# Patient Record
Sex: FEMALE | Race: WHITE | NOT HISPANIC OR LATINO
[De-identification: names, ages, dates, MRNs, and addresses within clinical notes are randomized per-mention and may not be internally consistent; named-entity substitution may affect disease eponyms.]

---

## 2018-11-27 ENCOUNTER — RECORD ABSTRACTING (OUTPATIENT)
Age: 38
End: 2018-11-27

## 2018-11-27 DIAGNOSIS — Z83.1 FAMILY HISTORY OF OTHER INFECTIOUS AND PARASITIC DISEASES: ICD-10-CM

## 2018-11-27 DIAGNOSIS — Z82.49 FAMILY HISTORY OF ISCHEMIC HEART DISEASE AND OTHER DISEASES OF THE CIRCULATORY SYSTEM: ICD-10-CM

## 2018-11-27 DIAGNOSIS — Z86.19 PERSONAL HISTORY OF OTHER INFECTIOUS AND PARASITIC DISEASES: ICD-10-CM

## 2018-11-27 DIAGNOSIS — Z80.3 FAMILY HISTORY OF MALIGNANT NEOPLASM OF BREAST: ICD-10-CM

## 2019-01-18 ENCOUNTER — APPOINTMENT (OUTPATIENT)
Dept: OBGYN | Facility: CLINIC | Age: 39
End: 2019-01-18
Payer: COMMERCIAL

## 2019-01-18 VITALS
DIASTOLIC BLOOD PRESSURE: 68 MMHG | WEIGHT: 154.9 LBS | SYSTOLIC BLOOD PRESSURE: 110 MMHG | HEIGHT: 63 IN | BODY MASS INDEX: 27.45 KG/M2

## 2019-01-18 DIAGNOSIS — E55.9 VITAMIN D DEFICIENCY, UNSPECIFIED: ICD-10-CM

## 2019-01-18 PROCEDURE — 36415 COLL VENOUS BLD VENIPUNCTURE: CPT

## 2019-01-18 PROCEDURE — 99395 PREV VISIT EST AGE 18-39: CPT

## 2019-01-18 RX ORDER — DOCUSATE SODIUM 100 MG/1
100 CAPSULE ORAL
Refills: 0 | Status: DISCONTINUED | COMMUNITY
End: 2019-01-18

## 2019-01-18 RX ORDER — COCOA BUTTER, PHENYLEPHRINE HYDROCHLORIDE 2211; 6.25 MG/1; MG/1
0.25-3-85.5 SUPPOSITORY RECTAL
Refills: 0 | Status: DISCONTINUED | COMMUNITY
End: 2019-01-18

## 2019-01-22 LAB
25(OH)D3 SERPL-MCNC: 40.8 NG/ML
ALBUMIN SERPL ELPH-MCNC: 4.7 G/DL
ALP BLD-CCNC: 66 U/L
ALT SERPL-CCNC: 23 U/L
ANION GAP SERPL CALC-SCNC: 12 MMOL/L
AST SERPL-CCNC: 21 U/L
BASOPHILS # BLD AUTO: 0.01 K/UL
BASOPHILS NFR BLD AUTO: 0.2 %
BILIRUB SERPL-MCNC: 0.4 MG/DL
BUN SERPL-MCNC: 12 MG/DL
CALCIUM SERPL-MCNC: 9.6 MG/DL
CHLORIDE SERPL-SCNC: 102 MMOL/L
CO2 SERPL-SCNC: 26 MMOL/L
CREAT SERPL-MCNC: 0.64 MG/DL
EOSINOPHIL # BLD AUTO: 0.06 K/UL
EOSINOPHIL NFR BLD AUTO: 1.2 %
GLUCOSE SERPL-MCNC: 94 MG/DL
HCT VFR BLD CALC: 42.4 %
HGB BLD-MCNC: 14 G/DL
IMM GRANULOCYTES NFR BLD AUTO: 0 %
LYMPHOCYTES # BLD AUTO: 2.27 K/UL
LYMPHOCYTES NFR BLD AUTO: 46.2 %
MAN DIFF?: NORMAL
MCHC RBC-ENTMCNC: 29.9 PG
MCHC RBC-ENTMCNC: 33 GM/DL
MCV RBC AUTO: 90.4 FL
MONOCYTES # BLD AUTO: 0.49 K/UL
MONOCYTES NFR BLD AUTO: 10 %
NEUTROPHILS # BLD AUTO: 2.08 K/UL
NEUTROPHILS NFR BLD AUTO: 42.4 %
PLATELET # BLD AUTO: 294 K/UL
POTASSIUM SERPL-SCNC: 4.3 MMOL/L
PROT SERPL-MCNC: 7.5 G/DL
RBC # BLD: 4.69 M/UL
RBC # FLD: 13.3 %
SODIUM SERPL-SCNC: 140 MMOL/L
WBC # FLD AUTO: 4.91 K/UL

## 2020-12-11 ENCOUNTER — APPOINTMENT (OUTPATIENT)
Dept: OBGYN | Facility: CLINIC | Age: 40
End: 2020-12-11
Payer: COMMERCIAL

## 2020-12-11 VITALS
WEIGHT: 149 LBS | HEIGHT: 63 IN | BODY MASS INDEX: 26.4 KG/M2 | DIASTOLIC BLOOD PRESSURE: 72 MMHG | SYSTOLIC BLOOD PRESSURE: 100 MMHG

## 2020-12-11 PROCEDURE — 99396 PREV VISIT EST AGE 40-64: CPT

## 2020-12-11 PROCEDURE — 81003 URINALYSIS AUTO W/O SCOPE: CPT | Mod: QW

## 2020-12-11 PROCEDURE — 99072 ADDL SUPL MATRL&STAF TM PHE: CPT

## 2020-12-11 RX ORDER — MULTIVITAMIN
CAPSULE ORAL
Refills: 0 | Status: ACTIVE | COMMUNITY

## 2020-12-11 RX ORDER — SENNOSIDES 8.6 MG
TABLET ORAL
Refills: 0 | Status: ACTIVE | COMMUNITY

## 2020-12-11 RX ORDER — UBIDECARENONE/VIT E ACET 100MG-5
CAPSULE ORAL
Refills: 0 | Status: ACTIVE | COMMUNITY

## 2020-12-11 NOTE — HISTORY OF PRESENT ILLNESS
[FreeTextEntry1] : Here as an established pt. C/o urinary incontinence when running. Considering surgery  if she doesn't have another baby (still trying to convince ). 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 Roberto going to  and Auggy doing well, busy.\par \par LMP/yr: 12/10/20 regular\par G/P: P2\par Last pap/ result: \par Hx of abnormal pap: none\par Hx of STI: none\par Birth control: pull out\par OB hx: \par Total Pregnancies: 3, Full Term: 2, Ectopics: 0,  Induced: 0, Premature: 0, Livin,  Spontaneous: 1, Multiple Births: 0. \par Past Pregnancies: \par #1 (): Vaginal delivery of 7#13 boy, at 39wks weeks GA . patient received anesthesia. Pregnancy #1 Comments: ("Roberto" PROM spont ctx, GBS pos. Epidural. BF for 10mos). \par #2 (2018): Vaginal delivery of 9#5 boy . patient did not receive anesthesia. Delivery occurred at [facility]. \par Med hx: none\par Surg hx: lasik, wisdom teeth\par \par Last mammo/breast u/s:  nl\par Sexual hx:\par Sex:\par Diet/exercise: running 25 miles/ wk\par Stress mgmt:\par Meds/supplements: CoQ10, multi, vit d\par PCP:  Dr Leda Pires\par Interval hx: none\par Changes in family hx: none\par \par

## 2020-12-11 NOTE — DISCUSSION/SUMMARY
[FreeTextEntry1] : Pap/HPV done\par Mammogram Rx given\par Breast self exam reinforced\par Annual labs done with PCP\par Referral to PT for stress incontinence\par Diet and exercise reviewed\par Stress mgmt strategies reviewed\par Supplements: multivitamin, fish oil, Vit D\par RTO 1yr or prn\par

## 2020-12-11 NOTE — PHYSICAL EXAM
[Appropriately responsive] : appropriately responsive [Alert] : alert [No Acute Distress] : no acute distress [No Lymphadenopathy] : no lymphadenopathy [Regular Rate Rhythm] : regular rate rhythm [No Murmurs] : no murmurs [Clear to Auscultation B/L] : clear to auscultation bilaterally [Soft] : soft [Non-tender] : non-tender [Non-distended] : non-distended [No HSM] : No HSM [No Lesions] : no lesions [No Mass] : no mass [Oriented x3] : oriented x3 [Examination Of The Breasts] : a normal appearance [No Masses] : no breast masses were palpable [Labia Majora] : normal [Labia Minora] : normal [Normal] : normal [Uterine Adnexae] : normal [FreeTextEntry3] : Some bladder prolapse

## 2020-12-14 LAB
CLARITY UR: CLEAR
COLLECTION METHOD: NORMAL
GLUCOSE UR-MCNC: NEGATIVE
HGB UR QL STRIP.AUTO: NEGATIVE
HPV HIGH+LOW RISK DNA PNL CVX: NOT DETECTED
KETONES UR-MCNC: NEGATIVE
LEUKOCYTE ESTERASE UR QL STRIP: NEGATIVE
NITRITE UR QL STRIP: NEGATIVE
PROT UR STRIP-MCNC: NEGATIVE

## 2020-12-18 ENCOUNTER — TRANSCRIPTION ENCOUNTER (OUTPATIENT)
Age: 40
End: 2020-12-18

## 2020-12-18 LAB — CYTOLOGY CVX/VAG DOC THIN PREP: NORMAL

## 2021-01-01 ENCOUNTER — TRANSCRIPTION ENCOUNTER (OUTPATIENT)
Age: 41
End: 2021-01-01

## 2021-12-14 ENCOUNTER — APPOINTMENT (OUTPATIENT)
Dept: OBGYN | Facility: CLINIC | Age: 41
End: 2021-12-14
Payer: COMMERCIAL

## 2021-12-14 VITALS — HEIGHT: 63 IN | SYSTOLIC BLOOD PRESSURE: 104 MMHG | DIASTOLIC BLOOD PRESSURE: 60 MMHG

## 2021-12-14 DIAGNOSIS — N89.8 OTHER SPECIFIED NONINFLAMMATORY DISORDERS OF VAGINA: ICD-10-CM

## 2021-12-14 DIAGNOSIS — Z00.00 ENCOUNTER FOR GENERAL ADULT MEDICAL EXAMINATION W/OUT ABNORMAL FINDINGS: ICD-10-CM

## 2021-12-14 DIAGNOSIS — R30.0 DYSURIA: ICD-10-CM

## 2021-12-14 LAB
BILIRUB UR QL STRIP: NEGATIVE
CLARITY UR: CLEAR
COLLECTION METHOD: NORMAL
GLUCOSE UR-MCNC: NEGATIVE
HGB UR QL STRIP.AUTO: NORMAL
KETONES UR-MCNC: NEGATIVE
LEUKOCYTE ESTERASE UR QL STRIP: NORMAL
NITRITE UR QL STRIP: NEGATIVE
PROT UR STRIP-MCNC: NEGATIVE

## 2021-12-14 PROCEDURE — 81003 URINALYSIS AUTO W/O SCOPE: CPT | Mod: QW

## 2021-12-14 PROCEDURE — 99396 PREV VISIT EST AGE 40-64: CPT

## 2021-12-14 NOTE — PHYSICAL EXAM
[Appropriately responsive] : appropriately responsive [Alert] : alert [No Acute Distress] : no acute distress [No Lymphadenopathy] : no lymphadenopathy [Regular Rate Rhythm] : regular rate rhythm [No Murmurs] : no murmurs [Clear to Auscultation B/L] : clear to auscultation bilaterally [Soft] : soft [Non-tender] : non-tender [Non-distended] : non-distended [No HSM] : No HSM [No Lesions] : no lesions [No Mass] : no mass [Oriented x3] : oriented x3 [Examination Of The Breasts] : a normal appearance [No Masses] : no breast masses were palpable [Labia Majora] : normal [Labia Minora] : normal [Discharge] : a  ~M vaginal discharge was present [Normal] : normal [Uterine Adnexae] : normal [FreeTextEntry3] : No prolapse [FreeTextEntry4] : Strong kegels

## 2021-12-14 NOTE — DISCUSSION/SUMMARY
[FreeTextEntry1] : Pap/HPV deferred until 2023\par Mammogram Rx given\par Urine culture/ Affirm sent\par Breast self exam reinforced\par Annual labs with PCP next month\par Diet and exercise reviewed\par Stress mgmt strategies reviewed\par Supplements: multivitamin, fish oil, Vit D\par RTO 1yr or prn\par

## 2021-12-14 NOTE — HISTORY OF PRESENT ILLNESS
[FreeTextEntry1] : Here as an established pt for annual. C/o burning when peeing (had lots of sex this weekend), ongoing stress incontinence when sneezing.\par She wants a 3rd child,  unsure (age 35). She has been to rep endo before initial pregnancy and then got pregnant spontaneously. Discussed preconception genetic counseling - may consider IVF since  worried about having a genetically abnormal child. \par Roberto 5 and Julio 3 doing well.\par U/A: tr blood, mod leuk\par \par LMP/yr:  12/10/21\par Triad: monthly\par Menstrual issues:\par G/P:  P2\par Last pap/ result:  NILM\par Hx of abnormal pap: none\par Hx of STI: none\par Birth control: pull out\par Breast mammo/ultrasound:\par Procedures on ovaries/uterus/cervix:\par \par OB hx:   x 2 , \par Med hx:  none\par Surg hx: lasik, wisdom teeth\par Fam hx: MGM- breast cancer, MGF- meningitis,  PGF- MI\par \par \par Sexual hx: \par Sex: no issues\par Diet/exercise/sleep/digestion: no time to run right now, sleeping well\par Stress mgmt:\par Meds/supplements: multivitamin\par PCP:\par \par 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

## 2021-12-15 LAB
CANDIDA VAG CYTO: NOT DETECTED
G VAGINALIS+PREV SP MTYP VAG QL MICRO: NOT DETECTED
T VAGINALIS VAG QL WET PREP: NOT DETECTED

## 2021-12-17 LAB — BACTERIA UR CULT: NORMAL

## 2022-12-22 ENCOUNTER — APPOINTMENT (OUTPATIENT)
Dept: OBGYN | Facility: CLINIC | Age: 42
End: 2022-12-22

## 2022-12-22 ENCOUNTER — NON-APPOINTMENT (OUTPATIENT)
Age: 42
End: 2022-12-22

## 2022-12-22 VITALS
WEIGHT: 159 LBS | BODY MASS INDEX: 28.17 KG/M2 | SYSTOLIC BLOOD PRESSURE: 98 MMHG | HEIGHT: 63 IN | DIASTOLIC BLOOD PRESSURE: 60 MMHG

## 2022-12-22 DIAGNOSIS — Z12.31 ENCOUNTER FOR SCREENING MAMMOGRAM FOR MALIGNANT NEOPLASM OF BREAST: ICD-10-CM

## 2022-12-22 DIAGNOSIS — Z12.39 ENCOUNTER FOR OTHER SCREENING FOR MALIGNANT NEOPLASM OF BREAST: ICD-10-CM

## 2022-12-22 DIAGNOSIS — Z01.419 ENCOUNTER FOR GYNECOLOGICAL EXAMINATION (GENERAL) (ROUTINE) W/OUT ABNORMAL FINDINGS: ICD-10-CM

## 2022-12-22 PROCEDURE — 99396 PREV VISIT EST AGE 40-64: CPT

## 2022-12-22 NOTE — DISCUSSION/SUMMARY
[FreeTextEntry1] : Pap/HPV deferred until 2023\par Mammogram/ u/s Rx given\par Breast self exam reinforced\par Annual labs w PCP\par Diet and exercise reviewed\par Stress mgmt strategies reviewed\par Supplements: multivitamin, fish oil, Vit D\par RTO 1yr or prn\par

## 2022-12-22 NOTE — HISTORY OF PRESENT ILLNESS
[FreeTextEntry1] : Here as an established pt for annual. \par Ongoing stress incontinence when running in spring to . Considering surgery if she doesn't get pregnant. \par Did prenatal testing with fertility clinic -April up until she would need her 's sperm tested. He is still undecided even after therapy as a couple. \par She wants a 3rd child,  unsure (age 35). She has been to rep endo before initial pregnancy and then got pregnant spontaneously. \par Roberto 6 and Julio turning 4 on May 5th doing well.\par \par LMP/yr: \par Triad: monthly\par Menstrual issues: none\par G/P: P2\par Last pap/ result:  NILM\par Hx of abnormal pap: none\par Hx of STI: none\par Birth control: pull out\par Breast mammo/ultrasound: 2021 BIRADS 1\par Procedures on ovaries/uterus/cervix:\par \par OB hx:  x 2 2015\par Med hx: none\par Surg hx: lasik, wisdom teeth\par Fam hx: MGM- breast cancer, MGF- meningitis, PGF- MI\par \par \par Sexual hx: \par Sex: no issues\par Diet/exercise/sleep/digestion: doing home workout video with weights daily, no time to run right now, sleeping well\par Stress mgmt:\par Meds/supplements: multivitamin\par PCP:\par \par 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

## 2024-01-11 ENCOUNTER — APPOINTMENT (OUTPATIENT)
Dept: OBGYN | Facility: CLINIC | Age: 44
End: 2024-01-11
Payer: COMMERCIAL

## 2024-01-11 ENCOUNTER — ASOB RESULT (OUTPATIENT)
Age: 44
End: 2024-01-11

## 2024-01-11 PROCEDURE — 76817 TRANSVAGINAL US OBSTETRIC: CPT

## 2024-01-12 ENCOUNTER — APPOINTMENT (OUTPATIENT)
Dept: OBGYN | Facility: CLINIC | Age: 44
End: 2024-01-12
Payer: COMMERCIAL

## 2024-01-12 PROCEDURE — 36415 COLL VENOUS BLD VENIPUNCTURE: CPT

## 2024-01-13 DIAGNOSIS — O20.0 THREATENED ABORTION: ICD-10-CM

## 2024-01-13 RX ORDER — PROGESTERONE 200 MG/1
200 CAPSULE ORAL
Refills: 0 | Status: ACTIVE | COMMUNITY
Start: 2024-01-13

## 2024-01-15 ENCOUNTER — NON-APPOINTMENT (OUTPATIENT)
Age: 44
End: 2024-01-15

## 2024-01-16 ENCOUNTER — NON-APPOINTMENT (OUTPATIENT)
Age: 44
End: 2024-01-16

## 2024-01-17 ENCOUNTER — TRANSCRIPTION ENCOUNTER (OUTPATIENT)
Age: 44
End: 2024-01-17

## 2024-01-29 ENCOUNTER — APPOINTMENT (OUTPATIENT)
Dept: OBGYN | Facility: CLINIC | Age: 44
End: 2024-01-29
Payer: COMMERCIAL

## 2024-01-29 VITALS
WEIGHT: 165.38 LBS | DIASTOLIC BLOOD PRESSURE: 70 MMHG | HEIGHT: 63 IN | SYSTOLIC BLOOD PRESSURE: 110 MMHG | BODY MASS INDEX: 29.3 KG/M2

## 2024-01-29 DIAGNOSIS — O03.9 COMPLETE OR UNSPECIFIED SPONTANEOUS ABORTION W/OUT COMPLICATION: ICD-10-CM

## 2024-01-29 PROCEDURE — 99213 OFFICE O/P EST LOW 20 MIN: CPT

## 2024-01-29 PROCEDURE — 36415 COLL VENOUS BLD VENIPUNCTURE: CPT
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
## 2024-01-31 ENCOUNTER — TRANSCRIPTION ENCOUNTER (OUTPATIENT)
Age: 44
End: 2024-01-31

## 2024-01-31 LAB — HCG SERPL-MCNC: 12 MIU/ML

## 2024-02-08 ENCOUNTER — TRANSCRIPTION ENCOUNTER (OUTPATIENT)
Age: 44
End: 2024-02-08

## 2024-02-15 ENCOUNTER — APPOINTMENT (OUTPATIENT)
Dept: OBGYN | Facility: CLINIC | Age: 44
End: 2024-02-15
Payer: COMMERCIAL

## 2024-02-15 PROCEDURE — 36415 COLL VENOUS BLD VENIPUNCTURE: CPT

## 2024-02-17 ENCOUNTER — TRANSCRIPTION ENCOUNTER (OUTPATIENT)
Age: 44
End: 2024-02-17

## 2024-02-17 LAB — HCG SERPL-MCNC: 1 MIU/ML

## 2024-02-20 ENCOUNTER — TRANSCRIPTION ENCOUNTER (OUTPATIENT)
Age: 44
End: 2024-02-20

## 2024-04-02 LAB
HCG SERPL-MCNC: 2583 MIU/ML
HCG SERPL-MCNC: 8115 MIU/ML
PROGEST SERPL-MCNC: 2.4 NG/ML
PROGEST SERPL-MCNC: 7.3 NG/ML

## 2025-01-31 ENCOUNTER — NON-APPOINTMENT (OUTPATIENT)
Age: 45
End: 2025-01-31

## 2025-01-31 ENCOUNTER — APPOINTMENT (OUTPATIENT)
Dept: OBGYN | Facility: CLINIC | Age: 45
End: 2025-01-31
Payer: COMMERCIAL

## 2025-01-31 VITALS
BODY MASS INDEX: 28.88 KG/M2 | SYSTOLIC BLOOD PRESSURE: 106 MMHG | WEIGHT: 163 LBS | DIASTOLIC BLOOD PRESSURE: 60 MMHG | HEIGHT: 63 IN

## 2025-01-31 DIAGNOSIS — E61.1 IRON DEFICIENCY: ICD-10-CM

## 2025-01-31 DIAGNOSIS — Z78.9 OTHER SPECIFIED HEALTH STATUS: ICD-10-CM

## 2025-01-31 PROCEDURE — 99396 PREV VISIT EST AGE 40-64: CPT

## 2025-02-04 LAB — HPV HIGH+LOW RISK DNA PNL CVX: NOT DETECTED

## 2025-02-08 ENCOUNTER — TRANSCRIPTION ENCOUNTER (OUTPATIENT)
Age: 45
End: 2025-02-08

## 2025-02-08 LAB — CYTOLOGY CVX/VAG DOC THIN PREP: NORMAL
